# Patient Record
Sex: FEMALE | Race: WHITE | NOT HISPANIC OR LATINO | URBAN - METROPOLITAN AREA
[De-identification: names, ages, dates, MRNs, and addresses within clinical notes are randomized per-mention and may not be internally consistent; named-entity substitution may affect disease eponyms.]

---

## 2019-12-26 ENCOUNTER — OUTPATIENT (OUTPATIENT)
Dept: OUTPATIENT SERVICES | Facility: HOSPITAL | Age: 69
LOS: 1 days | Discharge: HOME | End: 2019-12-26
Payer: MEDICARE

## 2019-12-26 PROCEDURE — C1894: CPT

## 2019-12-26 PROCEDURE — C1730: CPT

## 2019-12-26 PROCEDURE — 93620 COMP EP EVL R AT VEN PAC&REC: CPT

## 2019-12-26 NOTE — PROGRESS NOTE ADULT - SUBJECTIVE AND OBJECTIVE BOX
Cardiac Electrophysiology Admission Note    History of Present Illness:      Past Medical History:      Past Surgical History:      Family History: Non-contributory    Social History: denies smoking, drugs.  Social ETOH (0-5 drinks a week)    Allergies: shellfish, eggs, soy, gluten, dairy, latex    Medications: Reglan PRN and Zyrtec PRN    Physical:     HR: 95		BP: 125/78		O2 Sat 100%  	Temp: afebrile  GEN: NAD  HEENT: no JVD  CARDS: S1S2 RRR  LUNGS: CTAB no w/r/r  EXT: no edema  NEURO: no deficit noted    EKG: Normal sinus rhythm.    A/P: Cardiac Electrophysiology Admission Note    History of Present Illness: 69 y.o F with pmhx of COPD, ex smoker, and HTN who was found to have NSVT while on treadmill during pulmonary rehab, now presenting for EPS.     The patient states that she feels well. Denies c/p, sob, lightheadedness, palpitations and recent syncope. She didn't take her medications this morning. She has been taking Toprol 12.5 mg daily since the NSVT. Of note, she has a sister who had SCD.        Past Medical History:    See above    Past Surgical History:    None    Family History:   SCD: sister    Social History: ex-smoker    Allergies: Sulfa (rash)    Medications:   Advair  Spiriva  Lisinopril 10 mg QD  Triamterene/HCTZ 37.5mg/25mg QD  Toprol 12.5mg QD  Evista 60mg QD  Ventolin PRN       Physical:     HR: 95		BP: 137/78		O2 Sat 100%  	Temp: afebrile    GEN: NAD  HEENT: no JVD  CARDS: S1S2 RRR  LUNGS: CTAB no w/r/r  EXT: no edema  NEURO: no deficit noted    EKG: Normal sinus rhythm.    A/P:    - The procedure and associated risks, including but not limited to infection, bleeding, and perforation were among those discussed. All questions answered and informed consent signed.   - Pt to be discharged following procedure.

## 2020-01-08 DIAGNOSIS — R00.2 PALPITATIONS: ICD-10-CM

## 2020-01-08 DIAGNOSIS — R06.02 SHORTNESS OF BREATH: ICD-10-CM
